# Patient Record
Sex: FEMALE | Race: WHITE | NOT HISPANIC OR LATINO
[De-identification: names, ages, dates, MRNs, and addresses within clinical notes are randomized per-mention and may not be internally consistent; named-entity substitution may affect disease eponyms.]

---
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
## 2023-01-05 ENCOUNTER — APPOINTMENT (OUTPATIENT)
Dept: OBGYN | Facility: CLINIC | Age: 18
End: 2023-01-05
Payer: MEDICAID

## 2023-01-05 VITALS — WEIGHT: 156 LBS | SYSTOLIC BLOOD PRESSURE: 119 MMHG | DIASTOLIC BLOOD PRESSURE: 82 MMHG

## 2023-01-05 LAB
BILIRUB UR QL STRIP: NORMAL
GLUCOSE UR-MCNC: NORMAL
HCG UR QL: 0.2 EU/DL
HGB UR QL STRIP.AUTO: NORMAL
KETONES UR-MCNC: NORMAL
LEUKOCYTE ESTERASE UR QL STRIP: NORMAL
NITRITE UR QL STRIP: NORMAL
PH UR STRIP: 7.5
PROT UR STRIP-MCNC: NORMAL
SP GR UR STRIP: 1.01

## 2023-01-05 PROCEDURE — 81003 URINALYSIS AUTO W/O SCOPE: CPT | Mod: QW

## 2023-01-05 PROCEDURE — 99213 OFFICE O/P EST LOW 20 MIN: CPT | Mod: TH

## 2023-01-05 RX ORDER — ONDANSETRON 8 MG/1
8 TABLET, ORALLY DISINTEGRATING ORAL
Qty: 1 | Refills: 2 | Status: ACTIVE | COMMUNITY
Start: 2023-01-05 | End: 1900-01-01

## 2023-02-06 ENCOUNTER — APPOINTMENT (OUTPATIENT)
Dept: OBGYN | Facility: CLINIC | Age: 18
End: 2023-02-06
Payer: MEDICAID

## 2023-02-06 VITALS — WEIGHT: 153 LBS | DIASTOLIC BLOOD PRESSURE: 77 MMHG | SYSTOLIC BLOOD PRESSURE: 116 MMHG

## 2023-02-06 PROCEDURE — 99213 OFFICE O/P EST LOW 20 MIN: CPT | Mod: TH

## 2023-02-06 PROCEDURE — 81003 URINALYSIS AUTO W/O SCOPE: CPT | Mod: 59,QW

## 2023-03-02 ENCOUNTER — NON-APPOINTMENT (OUTPATIENT)
Age: 18
End: 2023-03-02

## 2023-03-16 ENCOUNTER — APPOINTMENT (OUTPATIENT)
Dept: OBGYN | Facility: CLINIC | Age: 18
End: 2023-03-16
Payer: MEDICAID

## 2023-03-16 ENCOUNTER — ASOB RESULT (OUTPATIENT)
Age: 18
End: 2023-03-16

## 2023-03-16 ENCOUNTER — APPOINTMENT (OUTPATIENT)
Dept: ANTEPARTUM | Facility: CLINIC | Age: 18
End: 2023-03-16
Payer: MEDICAID

## 2023-03-16 VITALS
SYSTOLIC BLOOD PRESSURE: 109 MMHG | BODY MASS INDEX: 24.33 KG/M2 | DIASTOLIC BLOOD PRESSURE: 73 MMHG | HEIGHT: 67 IN | WEIGHT: 155 LBS

## 2023-03-16 DIAGNOSIS — Z00.00 ENCOUNTER FOR GENERAL ADULT MEDICAL EXAMINATION W/OUT ABNORMAL FINDINGS: ICD-10-CM

## 2023-03-16 LAB
BILIRUB UR QL STRIP: NORMAL
GLUCOSE UR-MCNC: NORMAL
HCG UR QL: 0.2 EU/DL
HGB UR QL STRIP.AUTO: NORMAL
KETONES UR-MCNC: NORMAL
LEUKOCYTE ESTERASE UR QL STRIP: NORMAL
NITRITE UR QL STRIP: POSITIVE
PH UR STRIP: 7.5
PROT UR STRIP-MCNC: NORMAL
SP GR UR STRIP: 1.02

## 2023-03-16 PROCEDURE — 81003 URINALYSIS AUTO W/O SCOPE: CPT | Mod: QW

## 2023-03-16 PROCEDURE — 99213 OFFICE O/P EST LOW 20 MIN: CPT | Mod: TH

## 2023-03-16 PROCEDURE — 76817 TRANSVAGINAL US OBSTETRIC: CPT | Mod: 59

## 2023-03-16 PROCEDURE — 76811 OB US DETAILED SNGL FETUS: CPT

## 2023-03-18 ENCOUNTER — NON-APPOINTMENT (OUTPATIENT)
Age: 18
End: 2023-03-18

## 2023-03-19 DIAGNOSIS — N39.0 URINARY TRACT INFECTION, SITE NOT SPECIFIED: ICD-10-CM

## 2023-03-19 RX ORDER — SULFAMETHOXAZOLE AND TRIMETHOPRIM 800; 160 MG/1; MG/1
800-160 TABLET ORAL
Qty: 14 | Refills: 0 | Status: ACTIVE | COMMUNITY
Start: 2023-03-19 | End: 1900-01-01

## 2023-03-28 ENCOUNTER — APPOINTMENT (OUTPATIENT)
Dept: ANTEPARTUM | Facility: CLINIC | Age: 18
End: 2023-03-28
Payer: MEDICAID

## 2023-03-28 ENCOUNTER — ASOB RESULT (OUTPATIENT)
Age: 18
End: 2023-03-28

## 2023-03-28 PROCEDURE — 76816 OB US FOLLOW-UP PER FETUS: CPT

## 2023-04-18 ENCOUNTER — APPOINTMENT (OUTPATIENT)
Dept: OBGYN | Facility: CLINIC | Age: 18
End: 2023-04-18
Payer: MEDICAID

## 2023-04-18 VITALS
DIASTOLIC BLOOD PRESSURE: 78 MMHG | WEIGHT: 155 LBS | HEIGHT: 67 IN | BODY MASS INDEX: 24.33 KG/M2 | SYSTOLIC BLOOD PRESSURE: 111 MMHG

## 2023-04-18 LAB
BILIRUB UR QL STRIP: NORMAL
GLUCOSE UR-MCNC: NORMAL
HCG UR QL: 0.2 EU/DL
HGB UR QL STRIP.AUTO: NORMAL
KETONES UR-MCNC: NORMAL
LEUKOCYTE ESTERASE UR QL STRIP: NORMAL
NITRITE UR QL STRIP: NORMAL
PH UR STRIP: 7
PROT UR STRIP-MCNC: NORMAL
SP GR UR STRIP: 1.02

## 2023-04-18 PROCEDURE — 99213 OFFICE O/P EST LOW 20 MIN: CPT | Mod: TH

## 2023-04-18 PROCEDURE — 81003 URINALYSIS AUTO W/O SCOPE: CPT | Mod: QW

## 2023-04-19 LAB
BASOPHILS # BLD AUTO: 0.01 K/UL
BASOPHILS NFR BLD AUTO: 0.1 %
EOSINOPHIL # BLD AUTO: 0.13 K/UL
EOSINOPHIL NFR BLD AUTO: 1.6 %
GLUCOSE 1H P 50 G GLC PO SERPL-MCNC: 89 MG/DL
HCT VFR BLD CALC: 34.7 %
HGB BLD-MCNC: 11.1 G/DL
IMM GRANULOCYTES NFR BLD AUTO: 0.2 %
LYMPHOCYTES # BLD AUTO: 1.64 K/UL
LYMPHOCYTES NFR BLD AUTO: 19.9 %
MAN DIFF?: NORMAL
MCHC RBC-ENTMCNC: 30.1 PG
MCHC RBC-ENTMCNC: 32 GM/DL
MCV RBC AUTO: 94 FL
MONOCYTES # BLD AUTO: 0.69 K/UL
MONOCYTES NFR BLD AUTO: 8.4 %
NEUTROPHILS # BLD AUTO: 5.77 K/UL
NEUTROPHILS NFR BLD AUTO: 69.8 %
PLATELET # BLD AUTO: 200 K/UL
RBC # BLD: 3.69 M/UL
RBC # FLD: 13.8 %
WBC # FLD AUTO: 8.26 K/UL

## 2023-04-20 LAB — BACTERIA UR CULT: NORMAL

## 2023-05-19 ENCOUNTER — APPOINTMENT (OUTPATIENT)
Dept: OBGYN | Facility: CLINIC | Age: 18
End: 2023-05-19
Payer: MEDICAID

## 2023-05-19 VITALS — DIASTOLIC BLOOD PRESSURE: 71 MMHG | SYSTOLIC BLOOD PRESSURE: 105 MMHG | WEIGHT: 161 LBS

## 2023-05-19 LAB
BILIRUB UR QL STRIP: NORMAL
GLUCOSE UR-MCNC: NORMAL
HCG UR QL: 0.2 EU/DL
HGB UR QL STRIP.AUTO: NORMAL
KETONES UR-MCNC: NORMAL
LEUKOCYTE ESTERASE UR QL STRIP: NORMAL
NITRITE UR QL STRIP: NORMAL
PH UR STRIP: 6.5
PROT UR STRIP-MCNC: NORMAL
SP GR UR STRIP: 1020

## 2023-05-19 PROCEDURE — 81003 URINALYSIS AUTO W/O SCOPE: CPT | Mod: QW

## 2023-05-19 PROCEDURE — 99213 OFFICE O/P EST LOW 20 MIN: CPT | Mod: TH

## 2023-06-06 ENCOUNTER — APPOINTMENT (OUTPATIENT)
Dept: OBGYN | Facility: CLINIC | Age: 18
End: 2023-06-06
Payer: MEDICAID

## 2023-06-06 VITALS — DIASTOLIC BLOOD PRESSURE: 72 MMHG | SYSTOLIC BLOOD PRESSURE: 116 MMHG | WEIGHT: 167 LBS

## 2023-06-06 PROCEDURE — 81003 URINALYSIS AUTO W/O SCOPE: CPT | Mod: QW

## 2023-06-06 PROCEDURE — 99213 OFFICE O/P EST LOW 20 MIN: CPT | Mod: TH

## 2023-06-08 ENCOUNTER — NON-APPOINTMENT (OUTPATIENT)
Age: 18
End: 2023-06-08

## 2023-06-21 ENCOUNTER — APPOINTMENT (OUTPATIENT)
Dept: OBGYN | Facility: CLINIC | Age: 18
End: 2023-06-21
Payer: MEDICAID

## 2023-06-21 VITALS — DIASTOLIC BLOOD PRESSURE: 77 MMHG | SYSTOLIC BLOOD PRESSURE: 117 MMHG | WEIGHT: 168 LBS

## 2023-06-21 LAB
BILIRUB UR QL STRIP: NORMAL
GLUCOSE UR-MCNC: NORMAL
HCG UR QL: 0.2 EU/DL
HGB UR QL STRIP.AUTO: NORMAL
KETONES UR-MCNC: NORMAL
LEUKOCYTE ESTERASE UR QL STRIP: NORMAL
NITRITE UR QL STRIP: NORMAL
PH UR STRIP: 7
PROT UR STRIP-MCNC: NORMAL
SP GR UR STRIP: 1.01

## 2023-06-21 PROCEDURE — 81003 URINALYSIS AUTO W/O SCOPE: CPT | Mod: QW

## 2023-06-21 PROCEDURE — 76816 OB US FOLLOW-UP PER FETUS: CPT

## 2023-06-21 PROCEDURE — 99213 OFFICE O/P EST LOW 20 MIN: CPT | Mod: TH,25

## 2023-06-29 ENCOUNTER — APPOINTMENT (OUTPATIENT)
Dept: OBGYN | Facility: CLINIC | Age: 18
End: 2023-06-29
Payer: MEDICAID

## 2023-06-29 ENCOUNTER — RESULT CHARGE (OUTPATIENT)
Age: 18
End: 2023-06-29

## 2023-06-29 VITALS
SYSTOLIC BLOOD PRESSURE: 114 MMHG | BODY MASS INDEX: 26.68 KG/M2 | HEIGHT: 67 IN | WEIGHT: 170 LBS | DIASTOLIC BLOOD PRESSURE: 76 MMHG

## 2023-06-29 LAB
BILIRUB UR QL STRIP: NORMAL
GLUCOSE UR-MCNC: NORMAL
HCG UR QL: 0.2 EU/DL
HGB UR QL STRIP.AUTO: NORMAL
KETONES UR-MCNC: NORMAL
LEUKOCYTE ESTERASE UR QL STRIP: NORMAL
NITRITE UR QL STRIP: NORMAL
PH UR STRIP: 6.5
PROT UR STRIP-MCNC: NORMAL
SP GR UR STRIP: 1.02

## 2023-06-29 PROCEDURE — 81003 URINALYSIS AUTO W/O SCOPE: CPT | Mod: QW

## 2023-06-29 PROCEDURE — 99213 OFFICE O/P EST LOW 20 MIN: CPT | Mod: TH

## 2023-06-30 LAB — HIV1+2 AB SPEC QL IA.RAPID: NONREACTIVE

## 2023-07-03 ENCOUNTER — NON-APPOINTMENT (OUTPATIENT)
Age: 18
End: 2023-07-03

## 2023-07-03 LAB
B-HEM STREP SPEC QL CULT: NORMAL
HCV AB SER QL: NONREACTIVE
HCV S/CO RATIO: 0.2 S/CO
RPR SER-TITR: NORMAL

## 2023-07-06 ENCOUNTER — APPOINTMENT (OUTPATIENT)
Dept: OBGYN | Facility: CLINIC | Age: 18
End: 2023-07-06
Payer: MEDICAID

## 2023-07-06 VITALS — DIASTOLIC BLOOD PRESSURE: 66 MMHG | SYSTOLIC BLOOD PRESSURE: 101 MMHG | WEIGHT: 171 LBS

## 2023-07-06 LAB
BILIRUB UR QL STRIP: NORMAL
GLUCOSE UR-MCNC: NORMAL
HCG UR QL: 0.2 EU/DL
HGB UR QL STRIP.AUTO: NORMAL
KETONES UR-MCNC: NORMAL
LEUKOCYTE ESTERASE UR QL STRIP: NORMAL
NITRITE UR QL STRIP: NORMAL
PH UR STRIP: 6
PROT UR STRIP-MCNC: NORMAL
SP GR UR STRIP: 1.02

## 2023-07-06 PROCEDURE — 99213 OFFICE O/P EST LOW 20 MIN: CPT | Mod: TH

## 2023-07-06 PROCEDURE — 81003 URINALYSIS AUTO W/O SCOPE: CPT | Mod: QW

## 2023-07-14 ENCOUNTER — APPOINTMENT (OUTPATIENT)
Dept: OBGYN | Facility: CLINIC | Age: 18
End: 2023-07-14
Payer: MEDICAID

## 2023-07-14 VITALS
WEIGHT: 173 LBS | BODY MASS INDEX: 27.15 KG/M2 | HEIGHT: 67 IN | SYSTOLIC BLOOD PRESSURE: 100 MMHG | DIASTOLIC BLOOD PRESSURE: 69 MMHG

## 2023-07-14 LAB
BILIRUB UR QL STRIP: NORMAL
GLUCOSE UR-MCNC: NORMAL
HCG UR QL: 0.2 EU/DL
HGB UR QL STRIP.AUTO: NORMAL
KETONES UR-MCNC: NORMAL
LEUKOCYTE ESTERASE UR QL STRIP: NORMAL
NITRITE UR QL STRIP: NORMAL
PH UR STRIP: 6.5
PROT UR STRIP-MCNC: NORMAL
SP GR UR STRIP: 1.01

## 2023-07-14 PROCEDURE — 81003 URINALYSIS AUTO W/O SCOPE: CPT | Mod: QW

## 2023-07-14 PROCEDURE — 99213 OFFICE O/P EST LOW 20 MIN: CPT | Mod: TH

## 2023-07-19 ENCOUNTER — NON-APPOINTMENT (OUTPATIENT)
Age: 18
End: 2023-07-19

## 2023-07-20 ENCOUNTER — APPOINTMENT (OUTPATIENT)
Dept: OBGYN | Facility: CLINIC | Age: 18
End: 2023-07-20
Payer: MEDICAID

## 2023-07-20 VITALS
BODY MASS INDEX: 26.84 KG/M2 | DIASTOLIC BLOOD PRESSURE: 82 MMHG | SYSTOLIC BLOOD PRESSURE: 119 MMHG | HEIGHT: 67 IN | WEIGHT: 171 LBS

## 2023-07-20 PROCEDURE — 76819 FETAL BIOPHYS PROFIL W/O NST: CPT

## 2023-07-20 PROCEDURE — 99213 OFFICE O/P EST LOW 20 MIN: CPT | Mod: TH,25

## 2023-07-20 PROCEDURE — 81003 URINALYSIS AUTO W/O SCOPE: CPT | Mod: QW

## 2023-07-24 ENCOUNTER — APPOINTMENT (OUTPATIENT)
Dept: OBGYN | Facility: CLINIC | Age: 18
End: 2023-07-24
Payer: MEDICAID

## 2023-07-24 VITALS
SYSTOLIC BLOOD PRESSURE: 116 MMHG | BODY MASS INDEX: 27.31 KG/M2 | HEIGHT: 67 IN | DIASTOLIC BLOOD PRESSURE: 79 MMHG | WEIGHT: 174 LBS

## 2023-07-24 LAB
BILIRUB UR QL STRIP: NORMAL
GLUCOSE UR-MCNC: NORMAL
HCG UR QL: 0.2 EU/DL
HGB UR QL STRIP.AUTO: NORMAL
KETONES UR-MCNC: NORMAL
LEUKOCYTE ESTERASE UR QL STRIP: NORMAL
NITRITE UR QL STRIP: NORMAL
PH UR STRIP: 6
PROT UR STRIP-MCNC: NORMAL
SP GR UR STRIP: 1.01

## 2023-07-24 PROCEDURE — 99213 OFFICE O/P EST LOW 20 MIN: CPT | Mod: TH,25

## 2023-07-24 PROCEDURE — 76818 FETAL BIOPHYS PROFILE W/NST: CPT

## 2023-07-24 PROCEDURE — 81003 URINALYSIS AUTO W/O SCOPE: CPT | Mod: QW

## 2023-07-26 ENCOUNTER — APPOINTMENT (OUTPATIENT)
Dept: OBGYN | Facility: CLINIC | Age: 18
End: 2023-07-26
Payer: MEDICAID

## 2023-07-26 VITALS
SYSTOLIC BLOOD PRESSURE: 116 MMHG | DIASTOLIC BLOOD PRESSURE: 80 MMHG | HEIGHT: 67 IN | WEIGHT: 173 LBS | BODY MASS INDEX: 27.15 KG/M2

## 2023-07-26 PROCEDURE — 99213 OFFICE O/P EST LOW 20 MIN: CPT | Mod: TH,25

## 2023-07-26 PROCEDURE — 76818 FETAL BIOPHYS PROFILE W/NST: CPT

## 2023-07-26 PROCEDURE — 81003 URINALYSIS AUTO W/O SCOPE: CPT | Mod: QW

## 2023-07-27 ENCOUNTER — INPATIENT (INPATIENT)
Facility: HOSPITAL | Age: 18
LOS: 0 days | Discharge: HOME | DRG: 560 | End: 2023-07-28
Attending: OBSTETRICS & GYNECOLOGY | Admitting: OBSTETRICS & GYNECOLOGY
Payer: MEDICAID

## 2023-07-27 ENCOUNTER — NON-APPOINTMENT (OUTPATIENT)
Age: 18
End: 2023-07-27

## 2023-07-27 VITALS — SYSTOLIC BLOOD PRESSURE: 127 MMHG | HEART RATE: 71 BPM | DIASTOLIC BLOOD PRESSURE: 75 MMHG

## 2023-07-27 DIAGNOSIS — Z3A.00 WEEKS OF GESTATION OF PREGNANCY NOT SPECIFIED: ICD-10-CM

## 2023-07-27 DIAGNOSIS — O26.893 OTHER SPECIFIED PREGNANCY RELATED CONDITIONS, THIRD TRIMESTER: ICD-10-CM

## 2023-07-27 DIAGNOSIS — O26.899 OTHER SPECIFIED PREGNANCY RELATED CONDITIONS, UNSPECIFIED TRIMESTER: ICD-10-CM

## 2023-07-27 LAB
BASOPHILS # BLD AUTO: 0.01 K/UL — SIGNIFICANT CHANGE UP (ref 0–0.2)
BASOPHILS NFR BLD AUTO: 0.1 % — SIGNIFICANT CHANGE UP (ref 0–1)
BLD GP AB SCN SERPL QL: SIGNIFICANT CHANGE UP
EOSINOPHIL # BLD AUTO: 0.06 K/UL — SIGNIFICANT CHANGE UP (ref 0–0.7)
EOSINOPHIL NFR BLD AUTO: 0.6 % — SIGNIFICANT CHANGE UP (ref 0–8)
HCT VFR BLD CALC: 38.7 % — SIGNIFICANT CHANGE UP (ref 37–47)
HGB BLD-MCNC: 13.1 G/DL — SIGNIFICANT CHANGE UP (ref 12–16)
IMM GRANULOCYTES NFR BLD AUTO: 0.7 % — HIGH (ref 0.1–0.3)
LYMPHOCYTES # BLD AUTO: 1.78 K/UL — SIGNIFICANT CHANGE UP (ref 1.2–3.4)
LYMPHOCYTES # BLD AUTO: 16.6 % — LOW (ref 20.5–51.1)
MCHC RBC-ENTMCNC: 29.8 PG — SIGNIFICANT CHANGE UP (ref 27–31)
MCHC RBC-ENTMCNC: 33.9 G/DL — SIGNIFICANT CHANGE UP (ref 32–37)
MCV RBC AUTO: 88 FL — SIGNIFICANT CHANGE UP (ref 81–99)
MONOCYTES # BLD AUTO: 0.71 K/UL — HIGH (ref 0.1–0.6)
MONOCYTES NFR BLD AUTO: 6.6 % — SIGNIFICANT CHANGE UP (ref 1.7–9.3)
NEUTROPHILS # BLD AUTO: 8.12 K/UL — HIGH (ref 1.4–6.5)
NEUTROPHILS NFR BLD AUTO: 75.4 % — HIGH (ref 42.2–75.2)
NRBC # BLD: 0 /100 WBCS — SIGNIFICANT CHANGE UP (ref 0–0)
PLATELET # BLD AUTO: 147 K/UL — SIGNIFICANT CHANGE UP (ref 130–400)
PMV BLD: 12.8 FL — HIGH (ref 7.4–10.4)
PRENATAL SYPHILIS TEST: SIGNIFICANT CHANGE UP
RBC # BLD: 4.4 M/UL — SIGNIFICANT CHANGE UP (ref 4.2–5.4)
RBC # FLD: 12.6 % — SIGNIFICANT CHANGE UP (ref 11.5–14.5)
WBC # BLD: 10.75 K/UL — SIGNIFICANT CHANGE UP (ref 4.8–10.8)
WBC # FLD AUTO: 10.75 K/UL — SIGNIFICANT CHANGE UP (ref 4.8–10.8)

## 2023-07-27 PROCEDURE — 86850 RBC ANTIBODY SCREEN: CPT

## 2023-07-27 PROCEDURE — 86901 BLOOD TYPING SEROLOGIC RH(D): CPT

## 2023-07-27 PROCEDURE — 86592 SYPHILIS TEST NON-TREP QUAL: CPT

## 2023-07-27 PROCEDURE — 59409 OBSTETRICAL CARE: CPT | Mod: U9

## 2023-07-27 PROCEDURE — 36415 COLL VENOUS BLD VENIPUNCTURE: CPT

## 2023-07-27 PROCEDURE — 86900 BLOOD TYPING SEROLOGIC ABO: CPT

## 2023-07-27 PROCEDURE — 85025 COMPLETE CBC W/AUTO DIFF WBC: CPT

## 2023-07-27 PROCEDURE — 59050 FETAL MONITOR W/REPORT: CPT

## 2023-07-27 RX ORDER — PRAMOXINE HYDROCHLORIDE 150 MG/15G
1 AEROSOL, FOAM RECTAL EVERY 4 HOURS
Refills: 0 | Status: DISCONTINUED | OUTPATIENT
Start: 2023-07-27 | End: 2023-07-28

## 2023-07-27 RX ORDER — AER TRAVELER 0.5 G/1
1 SOLUTION RECTAL; TOPICAL EVERY 4 HOURS
Refills: 0 | Status: DISCONTINUED | OUTPATIENT
Start: 2023-07-27 | End: 2023-07-28

## 2023-07-27 RX ORDER — SODIUM CHLORIDE 9 MG/ML
1000 INJECTION, SOLUTION INTRAVENOUS
Refills: 0 | Status: DISCONTINUED | OUTPATIENT
Start: 2023-07-27 | End: 2023-07-27

## 2023-07-27 RX ORDER — KETOROLAC TROMETHAMINE 30 MG/ML
30 SYRINGE (ML) INJECTION ONCE
Refills: 0 | Status: DISCONTINUED | OUTPATIENT
Start: 2023-07-27 | End: 2023-07-27

## 2023-07-27 RX ORDER — HYDROCORTISONE 1 %
1 OINTMENT (GRAM) TOPICAL EVERY 6 HOURS
Refills: 0 | Status: DISCONTINUED | OUTPATIENT
Start: 2023-07-27 | End: 2023-07-28

## 2023-07-27 RX ORDER — ONDANSETRON 8 MG/1
4 TABLET, FILM COATED ORAL ONCE
Refills: 0 | Status: DISCONTINUED | OUTPATIENT
Start: 2023-07-27 | End: 2023-07-28

## 2023-07-27 RX ORDER — LANOLIN
1 OINTMENT (GRAM) TOPICAL EVERY 6 HOURS
Refills: 0 | Status: DISCONTINUED | OUTPATIENT
Start: 2023-07-27 | End: 2023-07-28

## 2023-07-27 RX ORDER — DIBUCAINE 1 %
1 OINTMENT (GRAM) RECTAL EVERY 6 HOURS
Refills: 0 | Status: DISCONTINUED | OUTPATIENT
Start: 2023-07-27 | End: 2023-07-28

## 2023-07-27 RX ORDER — SODIUM CHLORIDE 9 MG/ML
3 INJECTION INTRAMUSCULAR; INTRAVENOUS; SUBCUTANEOUS EVERY 8 HOURS
Refills: 0 | Status: DISCONTINUED | OUTPATIENT
Start: 2023-07-27 | End: 2023-07-28

## 2023-07-27 RX ORDER — DIPHENHYDRAMINE HCL 50 MG
25 CAPSULE ORAL EVERY 6 HOURS
Refills: 0 | Status: DISCONTINUED | OUTPATIENT
Start: 2023-07-27 | End: 2023-07-28

## 2023-07-27 RX ORDER — IBUPROFEN 200 MG
600 TABLET ORAL EVERY 6 HOURS
Refills: 0 | Status: DISCONTINUED | OUTPATIENT
Start: 2023-07-27 | End: 2023-07-28

## 2023-07-27 RX ORDER — BENZOCAINE 10 %
1 GEL (GRAM) MUCOUS MEMBRANE EVERY 6 HOURS
Refills: 0 | Status: DISCONTINUED | OUTPATIENT
Start: 2023-07-27 | End: 2023-07-28

## 2023-07-27 RX ORDER — TETANUS TOXOID, REDUCED DIPHTHERIA TOXOID AND ACELLULAR PERTUSSIS VACCINE, ADSORBED 5; 2.5; 8; 8; 2.5 [IU]/.5ML; [IU]/.5ML; UG/.5ML; UG/.5ML; UG/.5ML
0.5 SUSPENSION INTRAMUSCULAR ONCE
Refills: 0 | Status: DISCONTINUED | OUTPATIENT
Start: 2023-07-27 | End: 2023-07-28

## 2023-07-27 RX ORDER — OXYTOCIN 10 UNIT/ML
333.33 VIAL (ML) INJECTION
Qty: 20 | Refills: 0 | Status: DISCONTINUED | OUTPATIENT
Start: 2023-07-27 | End: 2023-07-28

## 2023-07-27 RX ORDER — IBUPROFEN 200 MG
600 TABLET ORAL EVERY 6 HOURS
Refills: 0 | Status: COMPLETED | OUTPATIENT
Start: 2023-07-27 | End: 2024-06-24

## 2023-07-27 RX ORDER — ACETAMINOPHEN 500 MG
975 TABLET ORAL
Refills: 0 | Status: DISCONTINUED | OUTPATIENT
Start: 2023-07-27 | End: 2023-07-28

## 2023-07-27 RX ORDER — OXYTOCIN 10 UNIT/ML
333.33 VIAL (ML) INJECTION
Qty: 20 | Refills: 0 | Status: COMPLETED | OUTPATIENT
Start: 2023-07-27 | End: 2023-07-27

## 2023-07-27 RX ORDER — OXYCODONE HYDROCHLORIDE 5 MG/1
5 TABLET ORAL
Refills: 0 | Status: DISCONTINUED | OUTPATIENT
Start: 2023-07-27 | End: 2023-07-28

## 2023-07-27 RX ORDER — MAGNESIUM HYDROXIDE 400 MG/1
30 TABLET, CHEWABLE ORAL
Refills: 0 | Status: DISCONTINUED | OUTPATIENT
Start: 2023-07-27 | End: 2023-07-28

## 2023-07-27 RX ORDER — SIMETHICONE 80 MG/1
80 TABLET, CHEWABLE ORAL EVERY 4 HOURS
Refills: 0 | Status: DISCONTINUED | OUTPATIENT
Start: 2023-07-27 | End: 2023-07-28

## 2023-07-27 RX ORDER — OXYCODONE HYDROCHLORIDE 5 MG/1
5 TABLET ORAL ONCE
Refills: 0 | Status: DISCONTINUED | OUTPATIENT
Start: 2023-07-27 | End: 2023-07-28

## 2023-07-27 RX ADMIN — Medication 30 MILLIGRAM(S): at 15:22

## 2023-07-27 RX ADMIN — SODIUM CHLORIDE 3 MILLILITER(S): 9 INJECTION INTRAMUSCULAR; INTRAVENOUS; SUBCUTANEOUS at 15:22

## 2023-07-27 RX ADMIN — Medication 600 MILLIGRAM(S): at 19:27

## 2023-07-27 RX ADMIN — Medication 1000 MILLIUNIT(S)/MIN: at 13:38

## 2023-07-27 RX ADMIN — SODIUM CHLORIDE 3 MILLILITER(S): 9 INJECTION INTRAMUSCULAR; INTRAVENOUS; SUBCUTANEOUS at 21:36

## 2023-07-27 RX ADMIN — Medication 600 MILLIGRAM(S): at 20:00

## 2023-07-27 RX ADMIN — Medication 30 MILLIGRAM(S): at 13:30

## 2023-07-27 NOTE — OB PROVIDER H&P - ASSESSMENT
18y  @ 41 weeks, GBS negative, SROM, in labor.    Admit to L & D  IV hydration, labs  Continuous EFM & TOCO monitoring  Clear Liquid diet  Pain Management PRN    Dr. Worthy aware

## 2023-07-27 NOTE — OB PROVIDER DELIVERY SUMMARY - NSPROVIDERDELIVERYNOTE_OBGYN_ALL_OB_FT
Patinet fully dilated, OA, pushed to deliver viable male .  Apgar 9/9.  Placenta intact with 3vc.  Cervix, vagina intact.  Median episiotomy repaired with 2-0/3-0 chromic.  Tolerated well

## 2023-07-27 NOTE — OB PROVIDER H&P - NSLOWPPHRISK_OBGYN_A_OB
No previous uterine incision/[place] Pregnancy/Less than or equal to 4 previous vaginal births/No known bleeding disorder/No history of postpartum hemorrhage/No other PPH risks indicated

## 2023-07-27 NOTE — OB RN PATIENT PROFILE - FALL HARM RISK - UNIVERSAL INTERVENTIONS
Bed in lowest position, wheels locked, appropriate side rails in place/Call bell, personal items and telephone in reach/Instruct patient to call for assistance before getting out of bed or chair/Non-slip footwear when patient is out of bed/[place] to call system/Physically safe environment - no spills, clutter or unnecessary equipment/Purposeful Proactive Rounding/Room/bathroom lighting operational, light cord in reach

## 2023-07-27 NOTE — OB RN PATIENT PROFILE - HOW OFTEN DO YOU HAVE A DRINK CONTAINING ALCOHOL?
LOV 1/15/20    LAST LAB 1/15/20    LAST RX 12/18/20 90 days 0 refills     Next OV No future appointments.       PROTOCOL  None
Never

## 2023-07-27 NOTE — OB PROVIDER H&P - NSHPLABSRESULTS_GEN_ALL_CORE
GCT 89    Sonograms:  3/28/2023: 23.5 weeks: breech, ant placenta, LUIS 5.98 cm  3/16/2023: 22.0 weeks:  gms, vtx, ant placenta, no previa, LUIS WNL, anatomy appears normal, CL 3.1 cm GCT 89    Mumps Non Immune    Sonograms:  3/28/2023: 23.5 weeks: breech, ant placenta, LUIS 5.98 cm  3/16/2023: 22.0 weeks:  gms, vtx, ant placenta, no previa, LUIS WNL, anatomy appears normal, CL 3.1 cm

## 2023-07-27 NOTE — OB PROVIDER H&P - HISTORY OF PRESENT ILLNESS
18y  @ 41 weeks by first trimester sonogram, ALLY 2023, presents for Rupture of membranes and ctx. Patient states she started yanick this morning at 0425, q 4 mins, followed by ROM at 0430, clear. GBS negative. Denies VB, LOF, and ctx. +FM. No complications with this pregnancy.

## 2023-07-27 NOTE — PROCEDURAL SAFETY CHECKLIST WITH OR WITHOUT SEDATION - NSPREIMAGEREVIEW_GEN_ALL_CORE
not applicable Normal vision: sees adequately in most situations; can see medication labels, newsprint

## 2023-07-27 NOTE — OB PROVIDER H&P - ATTENDING COMMENTS
20 y/o p0 at 41 wks w/srom this am, clear and irreg ctx, good fm, no bleeding.    gbs neg    abd: e=4634 g, nt  ext: no edema  cx: 5/90/-1/grossly rom, clear, adeq pelvis    nst: reactive  admit, analgesia, anticipate nsd

## 2023-07-27 NOTE — OB PROVIDER H&P - NSHPPHYSICALEXAM_GEN_ALL_CORE
T(C): 36.5 (07-27-23 @ 08:00), Max: 36.5 (07-27-23 @ 07:58)  HR: 71 (07-27-23 @ 08:00) (71 - 71)  BP: 127/75 (07-27-23 @ 08:00) (127/75 - 127/75)  RR: 16 (07-27-23 @ 08:00) (16 - 16)    EFM: 130 bpm, moderate variability, +accels  TOCO: q 2 mins    Abd: soft, gravid, nontender

## 2023-07-27 NOTE — OB PROVIDER DELIVERY SUMMARY - NSSELHIDDEN_OBGYN_ALL_OB_FT
[NS_DeliveryAttending1_OBGYN_ALL_OB_FT:[id number]=],[NS_DeliveryRN_OBGYN_ALL_OB_FT:[id number]=],[NS_CirculateRN2_OBGYN_ALL_OB_FT:[id number]=]

## 2023-07-27 NOTE — OB RN DELIVERY SUMMARY - NSSELHIDDEN_OBGYN_ALL_OB_FT
[NS_DeliveryAttending1_OBGYN_ALL_OB_FT:[id number]=],[NS_DeliveryRN_OBGYN_ALL_OB_FT:[id number]=],[NS_CirculateRN2_OBGYN_ALL_OB_FT:[id number]=]

## 2023-07-27 NOTE — PROCEDURE NOTE - ADDITIONAL PROCEDURE DETAILS
Thorough discussion of patient's history, as indicated above.  Discussed risks of spinal/epidural, including PDPH, inadequate analgesia occasionally requiring epidural catheter replacement/ general anesthesia, bleeding, infection and spinal cord injury. hypotension and nausea. Patient expressed understanding of these risks, signed informed consent and wishes to proceed with spinal/epidural    Patient sitting. Lumbar epidural performed at L3-4. Standard ASA monitors including FHR. Sterile gloves, Chloro-prep. 1% lidocaine for local infiltration. 17g touhy. ENID with air. 27g surekha used to make a dural puncture with + CSF. .25% Bupivacaine 1cc administered through spinal needle. Surekha needle removed and epidural catheter threaded easily. Touhy needle removed. Catheter secured in place at 10  cm. Negative aspiration. Test dose consisting of 3ml 1.5% lidocaine with epinephrine was negative. Remaining 2ml of test dose consisting of 1.5% lidocaine with epinephrine. Patient tolerated procedure and was hemodynamically stable throughout. T10 level bilaterally. Thorough discussion of patient's history, as indicated above.  Discussed risks of spinal/epidural, including PDPH, inadequate analgesia occasionally requiring epidural catheter replacement/ general anesthesia, bleeding, infection and spinal cord injury. hypotension and nausea. Patient expressed understanding of these risks, signed informed consent and wishes to proceed with spinal/epidural    Patient sitting. Lumbar epidural performed at L3-4. Standard ASA monitors including FHR. Sterile gloves, Chloro-prep. 1% lidocaine for local infiltration. 17g touhy. ENID with air. 27g surekha used to make a dural puncture with + CSF. .25% Bupivacaine 1cc administered through spinal needle. Surekha needle removed and epidural catheter threaded easily. Touhy needle removed. Catheter secured in place at 10  cm. Negative aspiration. Test dose consisting of 3ml 1.5% lidocaine with epinephrine was negative. Remaining 2ml of test dose consisting of 1.5% lidocaine with epinephrine. Patient tolerated procedure and was hemodynamically stable throughout. T10 level bilaterally.        Vital signs stable  No anesthesia complications from labor epidural  Patient discharged to OB post partum care as per policy

## 2023-07-28 ENCOUNTER — TRANSCRIPTION ENCOUNTER (OUTPATIENT)
Age: 18
End: 2023-07-28

## 2023-07-28 VITALS
RESPIRATION RATE: 18 BRPM | SYSTOLIC BLOOD PRESSURE: 104 MMHG | DIASTOLIC BLOOD PRESSURE: 60 MMHG | TEMPERATURE: 97 F | HEART RATE: 58 BPM

## 2023-07-28 LAB
BASOPHILS # BLD AUTO: 0.02 K/UL — SIGNIFICANT CHANGE UP (ref 0–0.2)
BASOPHILS NFR BLD AUTO: 0.2 % — SIGNIFICANT CHANGE UP (ref 0–1)
EOSINOPHIL # BLD AUTO: 0.04 K/UL — SIGNIFICANT CHANGE UP (ref 0–0.7)
EOSINOPHIL NFR BLD AUTO: 0.4 % — SIGNIFICANT CHANGE UP (ref 0–8)
HCT VFR BLD CALC: 33.6 % — LOW (ref 37–47)
HGB BLD-MCNC: 11.5 G/DL — LOW (ref 12–16)
IMM GRANULOCYTES NFR BLD AUTO: 0.5 % — HIGH (ref 0.1–0.3)
LYMPHOCYTES # BLD AUTO: 2.27 K/UL — SIGNIFICANT CHANGE UP (ref 1.2–3.4)
LYMPHOCYTES # BLD AUTO: 20 % — LOW (ref 20.5–51.1)
MCHC RBC-ENTMCNC: 30.3 PG — SIGNIFICANT CHANGE UP (ref 27–31)
MCHC RBC-ENTMCNC: 34.2 G/DL — SIGNIFICANT CHANGE UP (ref 32–37)
MCV RBC AUTO: 88.4 FL — SIGNIFICANT CHANGE UP (ref 81–99)
MONOCYTES # BLD AUTO: 0.85 K/UL — HIGH (ref 0.1–0.6)
MONOCYTES NFR BLD AUTO: 7.5 % — SIGNIFICANT CHANGE UP (ref 1.7–9.3)
NEUTROPHILS # BLD AUTO: 8.11 K/UL — HIGH (ref 1.4–6.5)
NEUTROPHILS NFR BLD AUTO: 71.4 % — SIGNIFICANT CHANGE UP (ref 42.2–75.2)
NRBC # BLD: 0 /100 WBCS — SIGNIFICANT CHANGE UP (ref 0–0)
PLATELET # BLD AUTO: 137 K/UL — SIGNIFICANT CHANGE UP (ref 130–400)
PMV BLD: 12.9 FL — HIGH (ref 7.4–10.4)
RBC # BLD: 3.8 M/UL — LOW (ref 4.2–5.4)
RBC # FLD: 12.9 % — SIGNIFICANT CHANGE UP (ref 11.5–14.5)
WBC # BLD: 11.35 K/UL — HIGH (ref 4.8–10.8)
WBC # FLD AUTO: 11.35 K/UL — HIGH (ref 4.8–10.8)

## 2023-07-28 PROCEDURE — 99231 SBSQ HOSP IP/OBS SF/LOW 25: CPT

## 2023-07-28 RX ORDER — ACETAMINOPHEN 500 MG
3 TABLET ORAL
Qty: 0 | Refills: 0 | DISCHARGE
Start: 2023-07-28

## 2023-07-28 RX ORDER — IBUPROFEN 200 MG
1 TABLET ORAL
Qty: 0 | Refills: 0 | DISCHARGE
Start: 2023-07-28

## 2023-07-28 RX ADMIN — Medication 600 MILLIGRAM(S): at 11:49

## 2023-07-28 RX ADMIN — Medication 600 MILLIGRAM(S): at 12:00

## 2023-07-28 RX ADMIN — Medication 600 MILLIGRAM(S): at 07:00

## 2023-07-28 RX ADMIN — Medication 600 MILLIGRAM(S): at 06:14

## 2023-07-28 RX ADMIN — SODIUM CHLORIDE 3 MILLILITER(S): 9 INJECTION INTRAMUSCULAR; INTRAVENOUS; SUBCUTANEOUS at 06:16

## 2023-07-28 NOTE — DISCHARGE NOTE OB - MATERIALS PROVIDED
Guide to Postpartum Care/Breastfeeding Guide and Packet/MMR Vaccination (VIS Pub Date: April 20, 2012)

## 2023-07-28 NOTE — DISCHARGE NOTE OB - PATIENT PORTAL LINK FT
You can access the FollowMyHealth Patient Portal offered by [facility] by registering at the following website: [email or web address]. By joining [facility]’s FollowMyHealth portal, you will also be able to view your health information using other applications (apps) compatible with our system.

## 2023-07-28 NOTE — DISCHARGE NOTE OB - CARE PLAN
1 Principal Discharge DX:	Vaginal delivery  Assessment and plan of treatment:	No heavy lifting. Nothing inside the vagina for 6 weeks: no tampons, douching, sexual intercourse, tub baths or pools. If you have a fever over 100.4F, severe abdominal pain or heavy vaginal bleeding please call your doctor or go to the emergency room.  Secondary Diagnosis:	Vaginal delivery

## 2023-07-28 NOTE — DISCHARGE NOTE OB - NSDCCONDITION_GEN_ALL_CORE
Informed patient we have not received the paperwork from the pharmacy for her diabetic supplies. She will call the pharmacy and ask them to send it again.  
Patient states [facility] faxed over Medicare forms that needed to be signed for her needles to be approved and filled. She states they indicated to her they faxed it over last week.   
Stable

## 2023-07-28 NOTE — DISCHARGE NOTE OB - NS MD DC FALL RISK RISK
For information on Fall & Injury Prevention, visit: [email or web address] OR  [email or web address] OR  https://www.cdc.gov/steadi/patient.html

## 2023-07-28 NOTE — PROGRESS NOTE ADULT - SUBJECTIVE AND OBJECTIVE BOX
Patient seen resting comfortably. No acute events overnight, no current complaints. Pain controlled with motrin. She reports lochia as minimal, no fevers, chills, nausea, vomiting, SOB, palpitations, dizziness. Patient is ambulating, tolerating diet, voiding freely without issue. Breastfeeding without difficulty.     Exam:  Gen: AAOx3  Breast: no engorgement, erythema, or tenderness  Abd: soft, non-tender, non-distended, uterus firm and to umbilicus  Ext: non-tender LE bilaterally    A: s/p NSD PPD#2, stable    Plan  - continue routine pp care  - encourage ambulating and breastfeeding  - motrin for pain   Patient seen resting comfortably. No acute events overnight, no current complaints. Pain controlled with motrin. She reports lochia as minimal, no fevers, chills, nausea, vomiting, SOB, palpitations, dizziness. Patient is ambulating, tolerating diet, voiding freely without issue. Breastfeeding without difficulty.     Exam:  Gen: AAOx3  Breast: no engorgement, erythema, or tenderness  Abd: soft, non-tender, non-distended, uterus firm and to umbilicus  Ext: non-tender LE bilaterally    A: s/p NSD PPD#1, stable    Plan  - continue routine pp care  - encourage ambulating and breastfeeding  - motrin for pain

## 2023-07-28 NOTE — DISCHARGE NOTE OB - CARE PROVIDER_API CALL
Lenard Worthy  Obstetrics and Gynecology  [address] [place] [address]  Phone: [phone]  Fax: [phone]  Scheduled Appointment: 09/08/2023

## 2023-07-31 ENCOUNTER — APPOINTMENT (OUTPATIENT)
Dept: OBGYN | Facility: CLINIC | Age: 18
End: 2023-07-31

## 2023-08-01 DIAGNOSIS — Z3A.41 41 WEEKS GESTATION OF PREGNANCY: ICD-10-CM

## 2023-08-01 DIAGNOSIS — O48.0 POST-TERM PREGNANCY: ICD-10-CM

## 2023-08-16 PROBLEM — Z78.9 OTHER SPECIFIED HEALTH STATUS: Chronic | Status: ACTIVE | Noted: 2023-07-27

## 2023-08-22 ENCOUNTER — APPOINTMENT (OUTPATIENT)
Dept: OBGYN | Facility: CLINIC | Age: 18
End: 2023-08-22
Payer: MEDICAID

## 2023-08-22 VITALS
HEIGHT: 67 IN | DIASTOLIC BLOOD PRESSURE: 70 MMHG | BODY MASS INDEX: 23.7 KG/M2 | WEIGHT: 151 LBS | SYSTOLIC BLOOD PRESSURE: 104 MMHG

## 2023-08-22 NOTE — HISTORY OF PRESENT ILLNESS
[Postpartum Follow Up] : postpartum follow up [Complications:___] : no complications [] : delivered by vaginal delivery [Wt. ___] : weighing [unfilled] [Breastfeeding] : currently nursing [Resumed Menses] : has not resumed her menses [Resumed Pearl] : has not resumed intercourse [Intended Contraception] : the patient does not intended to use contraception postpartum [Back to Normal] : is back to normal in size [Normal] : the vagina was normal [Cervix Sample Taken] : cervical sample not taken for a Pap smear [Examination Of The Breasts] : breasts are normal [Doing Well] : is doing well [No Sign of Infection] : is showing no signs of infection [None] : None [de-identified] : phq- 2 neg

## 2023-08-23 ENCOUNTER — NON-APPOINTMENT (OUTPATIENT)
Age: 18
End: 2023-08-23

## 2023-08-23 LAB
C TRACH RRNA SPEC QL NAA+PROBE: NOT DETECTED
N GONORRHOEA RRNA SPEC QL NAA+PROBE: NOT DETECTED
SOURCE AMPLIFICATION: NORMAL

## 2023-11-30 ENCOUNTER — APPOINTMENT (OUTPATIENT)
Dept: OBGYN | Facility: CLINIC | Age: 18
End: 2023-11-30

## 2023-12-12 ENCOUNTER — APPOINTMENT (OUTPATIENT)
Dept: OBGYN | Facility: CLINIC | Age: 18
End: 2023-12-12

## 2024-02-02 ENCOUNTER — APPOINTMENT (OUTPATIENT)
Dept: OBGYN | Facility: CLINIC | Age: 19
End: 2024-02-02
Payer: MEDICAID

## 2024-02-02 VITALS — WEIGHT: 144 LBS | DIASTOLIC BLOOD PRESSURE: 72 MMHG | SYSTOLIC BLOOD PRESSURE: 105 MMHG

## 2024-02-02 LAB
BILIRUB UR QL STRIP: NORMAL
GLUCOSE UR-MCNC: NORMAL
HCG UR QL: 0.2 EU/DL
HCG UR QL: POSITIVE
HGB UR QL STRIP.AUTO: NORMAL
KETONES UR-MCNC: NORMAL
LEUKOCYTE ESTERASE UR QL STRIP: NORMAL
NITRITE UR QL STRIP: NORMAL
PH UR STRIP: 6
PROT UR STRIP-MCNC: NORMAL
SP GR UR STRIP: 1.02

## 2024-02-02 PROCEDURE — 81025 URINE PREGNANCY TEST: CPT

## 2024-02-02 PROCEDURE — 99213 OFFICE O/P EST LOW 20 MIN: CPT | Mod: TH,25

## 2024-02-02 PROCEDURE — 81003 URINALYSIS AUTO W/O SCOPE: CPT | Mod: QW

## 2024-02-02 PROCEDURE — 76817 TRANSVAGINAL US OBSTETRIC: CPT

## 2024-02-02 NOTE — PHYSICAL EXAM
[Chaperone Present] : A chaperone was present in the examining room during all aspects of the physical examination [FreeTextEntry1] : [place]ie [Appropriately responsive] : appropriately responsive [Alert] : alert [No Acute Distress] : no acute distress [Soft] : soft [Non-tender] : non-tender [Non-distended] : non-distended [No HSM] : No HSM [No Lesions] : no lesions [No Mass] : no mass [Oriented x3] : oriented x3 [Labia Majora] : normal [Labia Minora] : normal [Normal] : normal [Uterine Adnexae] : normal

## 2024-02-02 NOTE — PLAN
[FreeTextEntry1] : early ob, crl 8 week, edc 09/27/2024 fh at 112 bpm? declines genetics pn labs ent from the office  n/v 4 week

## 2024-02-02 NOTE — HISTORY OF PRESENT ILLNESS
[FreeTextEntry1] : pt come sin for prenatal care   x 1 , full term , no complications , 7-7 lbs, pushed for 2 hrs no meds  no sob, no cp, full rom, no dysuria  lmp 23, irreg cycles was breastfeeding at the time of conceptions  still nursing now few tumes a day

## 2024-02-02 NOTE — COUNSELING
[Nutrition/ Exercise/ Weight Management] : nutrition, exercise, weight management [Breast Self Exam] : breast self exam [Bladder Hygiene] : bladder hygiene [Contraception/ Emergency Contraception/ Safe Sexual Practices] : contraception, emergency contraception, safe sexual practices [Preconception Care/ Fertility options] : preconception care, fertility options [Pregnancy Options] : pregnancy options [Influenza Vaccine] : influenza vaccine

## 2024-02-02 NOTE — PROCEDURE
[Transvaginal OB Sonogram] : Transvaginal OB Sonogram [Intrauterine Pregnancy] : intrauterine pregnancy [Yolk Sac] : yolk sac present [Fetal Heart] : fetal heart present [Date: ___] : Date: [unfilled] [Current GA by Sonogram: ___ (wks)] : Current GA by Sonogram: [unfilled]Uwks [___ day(s)] : [unfilled] days [Transvaginal OB Sonogram WNL] : Transvaginal OB Sonogram WNL [FreeTextEntry1] : iup in utero sara sac, crl 6.0 week , fh at 112 bpm

## 2024-02-05 LAB
ABO + RH PNL BLD: NORMAL
B19V IGG SER QL IA: 5.99 INDEX
B19V IGG+IGM SER-IMP: NORMAL
B19V IGG+IGM SER-IMP: POSITIVE
B19V IGM FLD-ACNC: 0.14 INDEX
B19V IGM SER-ACNC: NEGATIVE
BACTERIA UR CULT: NORMAL
BASOPHILS # BLD AUTO: 0 K/UL
BASOPHILS NFR BLD AUTO: 0 %
BLD GP AB SCN SERPL QL: NORMAL
EOSINOPHIL # BLD AUTO: 0.18 K/UL
EOSINOPHIL NFR BLD AUTO: 3 %
HBV SURFACE AG SER QL: NONREACTIVE
HCT VFR BLD CALC: 36.8 %
HGB BLD-MCNC: 11.7 G/DL
HIV1+2 AB SPEC QL IA.RAPID: NONREACTIVE
IMM GRANULOCYTES NFR BLD AUTO: 0.3 %
LEAD BLD-MCNC: <1 UG/DL
LYMPHOCYTES # BLD AUTO: 2.32 K/UL
LYMPHOCYTES NFR BLD AUTO: 38.1 %
MAN DIFF?: NORMAL
MCHC RBC-ENTMCNC: 28.3 PG
MCHC RBC-ENTMCNC: 31.8 GM/DL
MCV RBC AUTO: 89.1 FL
MEV IGG FLD QL IA: 131 AU/ML
MEV IGG+IGM SER-IMP: POSITIVE
MONOCYTES # BLD AUTO: 0.62 K/UL
MONOCYTES NFR BLD AUTO: 10.2 %
MUV AB SER-ACNC: NEGATIVE
MUV IGG SER QL IA: 8.2 AU/ML
NEUTROPHILS # BLD AUTO: 2.95 K/UL
NEUTROPHILS NFR BLD AUTO: 48.4 %
PLATELET # BLD AUTO: 253 K/UL
RBC # BLD: 4.13 M/UL
RBC # FLD: 13.7 %
RUBV IGG FLD-ACNC: 1.3 INDEX
RUBV IGG SER-IMP: POSITIVE
T PALLIDUM AB SER QL IA: NEGATIVE
VZV AB TITR SER: POSITIVE
VZV IGG SER IF-ACNC: 3096 INDEX
WBC # FLD AUTO: 6.09 K/UL

## 2024-02-15 ENCOUNTER — NON-APPOINTMENT (OUTPATIENT)
Age: 19
End: 2024-02-15

## 2024-02-20 ENCOUNTER — APPOINTMENT (OUTPATIENT)
Dept: OBGYN | Facility: CLINIC | Age: 19
End: 2024-02-20
Payer: MEDICAID

## 2024-02-20 VITALS — DIASTOLIC BLOOD PRESSURE: 75 MMHG | SYSTOLIC BLOOD PRESSURE: 114 MMHG | WEIGHT: 144 LBS

## 2024-02-20 DIAGNOSIS — O26.899 OTHER SPECIFIED PREGNANCY RELATED CONDITIONS, UNSPECIFIED TRIMESTER: ICD-10-CM

## 2024-02-20 DIAGNOSIS — R10.9 UNSPECIFIED ABDOMINAL PAIN: ICD-10-CM

## 2024-02-20 DIAGNOSIS — R10.9 OTHER SPECIFIED PREGNANCY RELATED CONDITIONS, UNSPECIFIED TRIMESTER: ICD-10-CM

## 2024-02-20 LAB
BILIRUB UR QL STRIP: NORMAL
GLUCOSE UR-MCNC: NORMAL
HCG UR QL: 0.2 EU/DL
HCG UR QL: POSITIVE
HGB UR QL STRIP.AUTO: NORMAL
KETONES UR-MCNC: NORMAL
LEUKOCYTE ESTERASE UR QL STRIP: NORMAL
NITRITE UR QL STRIP: NORMAL
PH UR STRIP: 7
PROT UR STRIP-MCNC: NORMAL
SP GR UR STRIP: 1.01

## 2024-02-20 PROCEDURE — 76830 TRANSVAGINAL US NON-OB: CPT

## 2024-02-20 PROCEDURE — 81025 URINE PREGNANCY TEST: CPT

## 2024-02-20 PROCEDURE — 81003 URINALYSIS AUTO W/O SCOPE: CPT | Mod: QW

## 2024-02-20 PROCEDURE — 99213 OFFICE O/P EST LOW 20 MIN: CPT | Mod: TH,25

## 2024-02-20 NOTE — HISTORY OF PRESENT ILLNESS
[FreeTextEntry1] : pt had sudden onset sharp abdominal pain today in the morning , the episode lasted for 5-10 minutes and now she feels mild ache   no vaginal bleeding she had one previous episode of pain similar at the end of last wk

## 2024-02-20 NOTE — DISCUSSION/SUMMARY
[FreeTextEntry1] : early pregancy with short episodes of intense pain. the left ovarian cyst does not appear to be large enough to be causing that adnexa to torse.  nor does it appear that it has ruptured,   pt to call if she has any further episodes the pregnancy has grown appropriately since last visit  n/v 4 wks earlier if she has more pain

## 2024-02-20 NOTE — PROCEDURE
[Transvaginal OB Sonogram] : Transvaginal OB Sonogram [Intrauterine Pregnancy] : intrauterine pregnancy [Yolk Sac] : yolk sac present [Fetal Heart] : fetal heart present [CRL: ___ (mm)] : CRL - [unfilled]Umm [Current GA by Sonogram: ___ (wks)] : Current GA by Sonogram: [unfilled]Uwks [___ day(s)] : [unfilled] days [Transvaginal OB Sonogram WNL] : Transvaginal OB Sonogram WNL [FreeTextEntry1] : both ovaries seen, the left ovary includes a simple cyst meas2.3cm by 2.1 cm  and the entire ovarian complex on the  left measures 4.2 cm by 2.2 cm.  there is no FF in the pelvis, the right ovary also seen, isreal 3.1 by 2.2 cm.

## 2024-02-20 NOTE — PHYSICAL EXAM
[Chaperone Present] : A chaperone was present in the examining room during all aspects of the physical examination [Soft] : soft [Non-tender] : non-tender [Non-distended] : non-distended [No Mass] : no mass [Oriented x3] : oriented x3 [Labia Majora] : normal [Labia Minora] : normal [Normal] : normal [Uterine Adnexae] : normal

## 2024-03-19 ENCOUNTER — APPOINTMENT (OUTPATIENT)
Dept: OBGYN | Facility: CLINIC | Age: 19
End: 2024-03-19
Payer: MEDICAID

## 2024-03-19 VITALS — DIASTOLIC BLOOD PRESSURE: 69 MMHG | SYSTOLIC BLOOD PRESSURE: 107 MMHG | WEIGHT: 144 LBS

## 2024-03-19 PROCEDURE — 99213 OFFICE O/P EST LOW 20 MIN: CPT | Mod: TH,25

## 2024-03-19 PROCEDURE — 81003 URINALYSIS AUTO W/O SCOPE: CPT | Mod: QW

## 2024-04-16 ENCOUNTER — APPOINTMENT (OUTPATIENT)
Dept: OBGYN | Facility: CLINIC | Age: 19
End: 2024-04-16
Payer: MEDICAID

## 2024-04-16 ENCOUNTER — RESULT CHARGE (OUTPATIENT)
Age: 19
End: 2024-04-16

## 2024-04-16 VITALS — DIASTOLIC BLOOD PRESSURE: 68 MMHG | WEIGHT: 144 LBS | SYSTOLIC BLOOD PRESSURE: 94 MMHG

## 2024-04-16 DIAGNOSIS — Z32.01 ENCOUNTER FOR PREGNANCY TEST, RESULT POSITIVE: ICD-10-CM

## 2024-04-16 DIAGNOSIS — N83.202 UNSPECIFIED OVARIAN CYST, LEFT SIDE: ICD-10-CM

## 2024-04-16 PROCEDURE — 99213 OFFICE O/P EST LOW 20 MIN: CPT | Mod: TH,25

## 2024-04-16 PROCEDURE — 76805 OB US >/= 14 WKS SNGL FETUS: CPT

## 2024-04-16 PROCEDURE — 81003 URINALYSIS AUTO W/O SCOPE: CPT | Mod: QW

## 2024-04-19 ENCOUNTER — NON-APPOINTMENT (OUTPATIENT)
Age: 19
End: 2024-04-19

## 2024-04-22 DIAGNOSIS — B95.1 STREPTOCOCCUS, GROUP B, AS THE CAUSE OF DISEASES CLASSIFIED ELSEWHERE: ICD-10-CM

## 2024-04-22 RX ORDER — AMPICILLIN 500 MG/1
500 CAPSULE ORAL
Qty: 10 | Refills: 0 | Status: ACTIVE | COMMUNITY
Start: 2024-04-22 | End: 1900-01-01

## 2024-05-12 ENCOUNTER — NON-APPOINTMENT (OUTPATIENT)
Age: 19
End: 2024-05-12

## 2024-05-14 ENCOUNTER — ASOB RESULT (OUTPATIENT)
Age: 19
End: 2024-05-14

## 2024-05-14 ENCOUNTER — APPOINTMENT (OUTPATIENT)
Dept: ANTEPARTUM | Facility: CLINIC | Age: 19
End: 2024-05-14
Payer: MEDICAID

## 2024-05-14 ENCOUNTER — APPOINTMENT (OUTPATIENT)
Dept: OBGYN | Facility: CLINIC | Age: 19
End: 2024-05-14
Payer: MEDICAID

## 2024-05-14 VITALS — WEIGHT: 148 LBS | DIASTOLIC BLOOD PRESSURE: 58 MMHG | SYSTOLIC BLOOD PRESSURE: 96 MMHG

## 2024-05-14 DIAGNOSIS — Z34.90 ENCOUNTER FOR SUPERVISION OF NORMAL PREGNANCY, UNSPECIFIED, UNSPECIFIED TRIMESTER: ICD-10-CM

## 2024-05-14 DIAGNOSIS — O35.9XX0 MATERNAL CARE FOR (SUSPECTED) FETAL ABNORMALITY AND DAMAGE, UNSPECIFIED, NOT APPLICABLE OR UNSPECIFIED: ICD-10-CM

## 2024-05-14 PROCEDURE — 81003 URINALYSIS AUTO W/O SCOPE: CPT | Mod: QW

## 2024-05-14 PROCEDURE — 76811 OB US DETAILED SNGL FETUS: CPT

## 2024-05-14 PROCEDURE — 99213 OFFICE O/P EST LOW 20 MIN: CPT | Mod: TH,25

## 2024-05-15 ENCOUNTER — APPOINTMENT (OUTPATIENT)
Dept: ANTEPARTUM | Facility: CLINIC | Age: 19
End: 2024-05-15
Payer: MEDICAID

## 2024-05-15 ENCOUNTER — APPOINTMENT (OUTPATIENT)
Dept: PEDIATRIC CARDIOLOGY | Facility: CLINIC | Age: 19
End: 2024-05-15
Payer: MEDICAID

## 2024-05-15 ENCOUNTER — ASOB RESULT (OUTPATIENT)
Age: 19
End: 2024-05-15

## 2024-05-15 ENCOUNTER — APPOINTMENT (OUTPATIENT)
Dept: MATERNAL FETAL MEDICINE | Facility: CLINIC | Age: 19
End: 2024-05-15
Payer: MEDICAID

## 2024-05-15 PROCEDURE — 76817 TRANSVAGINAL US OBSTETRIC: CPT

## 2024-05-15 PROCEDURE — 93325 DOPPLER ECHO COLOR FLOW MAPG: CPT | Mod: 59

## 2024-05-15 PROCEDURE — 76827 ECHO EXAM OF FETAL HEART: CPT

## 2024-05-15 PROCEDURE — 76816 OB US FOLLOW-UP PER FETUS: CPT

## 2024-05-15 PROCEDURE — 76820 UMBILICAL ARTERY ECHO: CPT

## 2024-05-15 PROCEDURE — 76821 MIDDLE CEREBRAL ARTERY ECHO: CPT

## 2024-05-15 PROCEDURE — 99214 OFFICE O/P EST MOD 30 MIN: CPT | Mod: TH,25

## 2024-05-15 PROCEDURE — 99205 OFFICE O/P NEW HI 60 MIN: CPT | Mod: 25

## 2024-05-15 PROCEDURE — ZZZZZ: CPT

## 2024-05-15 PROCEDURE — 76825 ECHO EXAM OF FETAL HEART: CPT

## 2024-05-16 ENCOUNTER — NON-APPOINTMENT (OUTPATIENT)
Age: 19
End: 2024-05-16

## 2024-05-17 ENCOUNTER — LABORATORY RESULT (OUTPATIENT)
Age: 19
End: 2024-05-17

## 2024-05-17 ENCOUNTER — APPOINTMENT (OUTPATIENT)
Dept: ANTEPARTUM | Facility: CLINIC | Age: 19
End: 2024-05-17
Payer: MEDICAID

## 2024-05-17 ENCOUNTER — ASOB RESULT (OUTPATIENT)
Age: 19
End: 2024-05-17

## 2024-05-17 PROCEDURE — 76815 OB US LIMITED FETUS(S): CPT

## 2024-05-17 PROCEDURE — 59000 AMNIOCENTESIS DIAGNOSTIC: CPT

## 2024-05-17 PROCEDURE — 76946 ECHO GUIDE FOR AMNIOCENTESIS: CPT

## 2024-05-23 ENCOUNTER — NON-APPOINTMENT (OUTPATIENT)
Age: 19
End: 2024-05-23

## 2024-05-23 ENCOUNTER — APPOINTMENT (OUTPATIENT)
Dept: OBGYN | Facility: CLINIC | Age: 19
End: 2024-05-23
Payer: MEDICAID

## 2024-05-23 PROCEDURE — 36415 COLL VENOUS BLD VENIPUNCTURE: CPT

## 2024-05-24 ENCOUNTER — NON-APPOINTMENT (OUTPATIENT)
Age: 19
End: 2024-05-24

## 2024-05-24 LAB
ALBUMIN SERPL ELPH-MCNC: 3.6 G/DL
ALP BLD-CCNC: 54 U/L
ALT SERPL-CCNC: 21 U/L
ANION GAP SERPL CALC-SCNC: 12 MMOL/L
AST SERPL-CCNC: 22 U/L
BILIRUB SERPL-MCNC: 0.2 MG/DL
BUN SERPL-MCNC: 10 MG/DL
CALCIUM SERPL-MCNC: 9.1 MG/DL
CHLORIDE SERPL-SCNC: 103 MMOL/L
CO2 SERPL-SCNC: 22 MMOL/L
CREAT SERPL-MCNC: 0.47 MG/DL
EGFR: 140 ML/MIN/1.73M2
GLUCOSE SERPL-MCNC: 65 MG/DL
POTASSIUM SERPL-SCNC: 4.5 MMOL/L
PROT SERPL-MCNC: 6.2 G/DL
SODIUM SERPL-SCNC: 137 MMOL/L

## 2024-05-26 LAB — BILE AC SER-MCNC: 3.6 UMOL/L

## 2024-06-04 ENCOUNTER — APPOINTMENT (OUTPATIENT)
Dept: ANTEPARTUM | Facility: CLINIC | Age: 19
End: 2024-06-04

## 2024-06-05 ENCOUNTER — APPOINTMENT (OUTPATIENT)
Dept: PEDIATRIC CARDIOLOGY | Facility: CLINIC | Age: 19
End: 2024-06-05

## 2024-12-16 ENCOUNTER — NON-APPOINTMENT (OUTPATIENT)
Age: 19
End: 2024-12-16

## 2024-12-16 ENCOUNTER — APPOINTMENT (OUTPATIENT)
Dept: COLORECTAL SURGERY | Facility: CLINIC | Age: 19
End: 2024-12-16
Payer: MEDICAID

## 2024-12-16 VITALS
BODY MASS INDEX: 23.54 KG/M2 | TEMPERATURE: 98.1 F | WEIGHT: 150 LBS | DIASTOLIC BLOOD PRESSURE: 66 MMHG | HEIGHT: 67 IN | HEART RATE: 64 BPM | SYSTOLIC BLOOD PRESSURE: 97 MMHG

## 2024-12-16 DIAGNOSIS — K60.2 ANAL FISSURE, UNSPECIFIED: ICD-10-CM

## 2024-12-16 PROCEDURE — 99202 OFFICE O/P NEW SF 15 MIN: CPT | Mod: 25

## 2024-12-16 PROCEDURE — 46600 DIAGNOSTIC ANOSCOPY SPX: CPT
